# Patient Record
Sex: MALE | Race: WHITE
[De-identification: names, ages, dates, MRNs, and addresses within clinical notes are randomized per-mention and may not be internally consistent; named-entity substitution may affect disease eponyms.]

---

## 2019-10-04 ENCOUNTER — HOSPITAL ENCOUNTER (OUTPATIENT)
Dept: HOSPITAL 62 - OROUT | Age: 54
Discharge: HOME | End: 2019-10-04
Attending: ORTHOPAEDIC SURGERY
Payer: COMMERCIAL

## 2019-10-04 VITALS — DIASTOLIC BLOOD PRESSURE: 86 MMHG | SYSTOLIC BLOOD PRESSURE: 150 MMHG
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
## 2019-10-04 DIAGNOSIS — S52.532B: Primary | ICD-10-CM

## 2019-10-04 DIAGNOSIS — Z86.14: ICD-10-CM

## 2019-10-04 DIAGNOSIS — F17.210: ICD-10-CM

## 2019-10-04 DIAGNOSIS — S52.92XA: ICD-10-CM

## 2019-10-04 DIAGNOSIS — S52.92XK: ICD-10-CM

## 2019-10-04 LAB
ADD MANUAL DIFF: NO
ANION GAP SERPL CALC-SCNC: 9 MMOL/L (ref 5–19)
BASOPHILS # BLD AUTO: 0.1 10^3/UL (ref 0–0.2)
BASOPHILS NFR BLD AUTO: 0.7 % (ref 0–2)
BUN SERPL-MCNC: 16 MG/DL (ref 7–20)
CALCIUM: 9.3 MG/DL (ref 8.4–10.2)
CHLORIDE SERPL-SCNC: 107 MMOL/L (ref 98–107)
CO2 SERPL-SCNC: 24 MMOL/L (ref 22–30)
CRP SERPL-MCNC: < 5 MG/L (ref ?–10)
EOSINOPHIL # BLD AUTO: 0.1 10^3/UL (ref 0–0.6)
EOSINOPHIL NFR BLD AUTO: 0.7 % (ref 0–6)
ERYTHROCYTE [DISTWIDTH] IN BLOOD BY AUTOMATED COUNT: 13.2 % (ref 11.5–14)
ERYTHROCYTE [SEDIMENTATION RATE] IN BLOOD: 9 MM/HR (ref 0–20)
GLUCOSE SERPL-MCNC: 103 MG/DL (ref 75–110)
HCT VFR BLD CALC: 43.6 % (ref 37.9–51)
HGB BLD-MCNC: 15.4 G/DL (ref 13.5–17)
LYMPHOCYTES # BLD AUTO: 2.4 10^3/UL (ref 0.5–4.7)
LYMPHOCYTES NFR BLD AUTO: 23.1 % (ref 13–45)
MCH RBC QN AUTO: 31.8 PG (ref 27–33.4)
MCHC RBC AUTO-ENTMCNC: 35.3 G/DL (ref 32–36)
MCV RBC AUTO: 90 FL (ref 80–97)
MONOCYTES # BLD AUTO: 0.6 10^3/UL (ref 0.1–1.4)
MONOCYTES NFR BLD AUTO: 5.4 % (ref 3–13)
NEUTROPHILS # BLD AUTO: 7.4 10^3/UL (ref 1.7–8.2)
NEUTS SEG NFR BLD AUTO: 70.1 % (ref 42–78)
PLATELET # BLD: 201 10^3/UL (ref 150–450)
POTASSIUM SERPL-SCNC: 4 MMOL/L (ref 3.6–5)
RBC # BLD AUTO: 4.84 10^6/UL (ref 4.35–5.55)
TOTAL CELLS COUNTED % (AUTO): 100 %
WBC # BLD AUTO: 10.5 10^3/UL (ref 4–10.5)

## 2019-10-04 PROCEDURE — 87070 CULTURE OTHR SPECIMN AEROBIC: CPT

## 2019-10-04 PROCEDURE — 25405 REPAIR/GRAFT RADIUS OR ULNA: CPT

## 2019-10-04 PROCEDURE — 87075 CULTR BACTERIA EXCEPT BLOOD: CPT

## 2019-10-04 PROCEDURE — C1769 GUIDE WIRE: HCPCS

## 2019-10-04 PROCEDURE — 85025 COMPLETE CBC W/AUTO DIFF WBC: CPT

## 2019-10-04 PROCEDURE — C1713 ANCHOR/SCREW BN/BN,TIS/BN: HCPCS

## 2019-10-04 PROCEDURE — 36415 COLL VENOUS BLD VENIPUNCTURE: CPT

## 2019-10-04 PROCEDURE — 71045 X-RAY EXAM CHEST 1 VIEW: CPT

## 2019-10-04 PROCEDURE — 20680 REMOVAL OF IMPLANT DEEP: CPT

## 2019-10-04 PROCEDURE — 93010 ELECTROCARDIOGRAM REPORT: CPT

## 2019-10-04 PROCEDURE — 73100 X-RAY EXAM OF WRIST: CPT

## 2019-10-04 PROCEDURE — 87205 SMEAR GRAM STAIN: CPT

## 2019-10-04 PROCEDURE — 80048 BASIC METABOLIC PNL TOTAL CA: CPT

## 2019-10-04 PROCEDURE — 86140 C-REACTIVE PROTEIN: CPT

## 2019-10-04 PROCEDURE — 85652 RBC SED RATE AUTOMATED: CPT

## 2019-10-04 PROCEDURE — 01830 ANES ARTHR/NDSC WRST/HND NOS: CPT

## 2019-10-04 PROCEDURE — 93005 ELECTROCARDIOGRAM TRACING: CPT

## 2019-10-04 PROCEDURE — C1898 LEAD, PMKR, OTHER THAN TRANS: HCPCS

## 2019-10-04 PROCEDURE — 64721 CARPAL TUNNEL SURGERY: CPT

## 2019-10-04 NOTE — DISCHARGE SUMMARY
Discharge Summary (SDC)





- Discharge


Final Diagnosis: 





Left distal radius nonunion


Date of Surgery: 10/04/19


Discharge Date: 10/04/19


Condition: Good


Treatment or Instructions: 








Schedule Follow Up w/ Dr. Rikki Kirkland @ [facility] to be seen 

in 10-14 days or as scheduled


[place]: [phone] 


[place]: [phone]


[place]: [phone] 





Ice and elevate





Keep splint clean/dry/intact, do not remove.





If your fingers become numb please unwrap the Ace wrap but leave the splint in 

place, if the sensation does not return within 30 minutes please return to the 

emergency department.





May begin finger range of motion attempting to make full fist.





Please be aware that the Percocet/Norco does contain Tylenol.





Stool softener of choice when on pain medication.








ORAL NARCOTIC MEDICATION:


     You have been given a prescription for pain control.  This medication is a 

narcotic.  It's best taken with food, as nausea can result if taken on an empty 

stomach.


     Don't operate machinery or drive within six hours of taking this 

medication.  Do not combine this medicine with alcohol, or with any medication 

which can cause sedation (such as cold tablets or sleeping pills) unless you get

permission from the physician.


     Narcotics tend to cause constipation.  If possible, drink plenty of fluids 

and eat a diet high in fiber and fruits.





     Please be aware that prescription narcotics also have the potential for 

abuse.  People become addicted to these medications because of the general sense

of wellbeing that they induce.  This feeling along with a significant reduction 

in tension, anxiety, and aggression provides a stimulating seductive quality to 

these drugs.  Once your pain is under control, we encourage you to discard your 

unused narcotics.





Prescriptions: 


Oxycodone HCl/Acetaminophen [Percocet  Mg Tablet] 1 each PO Q6 PRN #25 

tablet


 PRN Reason: 


Discharge Diet: As Tolerated


Respiratory Treatments at Home: Deep Breathing/Coughing, Incentive Spirometer


Discharge Activity: No Lifting Over 10 Pounds, No Lifting/Push/Pulling


Report the Following to Your Physician Immediately: Fever over 101 Degrees, 

Unusual Bleeding, Redness, Swelling, Warmth

## 2019-10-04 NOTE — RADIOLOGY REPORT (SQ)
EXAM DESCRIPTION:  CHEST SINGLE VIEW



COMPLETED DATE/TIME:  10/4/2019 10:17 am



REASON FOR STUDY:  preop



COMPARISON:  None.



EXAM PARAMETERS:  NUMBER OF VIEWS: One view.

TECHNIQUE: Single frontal radiographic view of the chest acquired.

RADIATION DOSE: NA

LIMITATIONS: None.



FINDINGS:  LUNGS AND PLEURA: No opacities, masses or pneumothorax. No pleural effusion.

MEDIASTINUM AND HILAR STRUCTURES: No masses.  Contour normal.

HEART AND VASCULAR STRUCTURES: Heart normal in size.  Normal vasculature.

BONES: No acute findings.

HARDWARE: None in the chest.

OTHER: No other significant finding.



IMPRESSION:  NO ACUTE RADIOGRAPHIC FINDING IN THE CHEST.



TECHNICAL DOCUMENTATION:  JOB ID:  [id number]

 2011 [employer]- All Rights Reserved



Reading location - IP/workstation name: [email or web address]

## 2019-10-04 NOTE — OPERATIVE REPORT
Operative Report


DATE OF SURGERY: 10/04/19


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


POSTOPERATIVE DIAGNOSIS: Same


OPERATION: 1. Removal of hardware left distal radius.  2. Takedown nonunion with

harvesting of olecranon autograft with revision open reduction to fixation 

extra-articular distal radius fracture.  3.  Median neurolysis


SURGEON: ROJELIO DIGGS


ANESTHESIA: GA


COMPLICATIONS: 





None


ESTIMATED BLOOD LOSS: 30 cc


PROCEDURE: 





Indication for above procedure:





54-year-old male who sustained a severe left wrist injury resulting in an 

apparent open fracture.  Patient underwent open reduction to fixation in 

[place] and originally he had been doing well upon follow-up with me he 

was placed in a brace which she was not to remove only for hygiene purposes.  

With subsequent follow-ups radiographs demonstrate increased evidence of 

malunion and lack of fracture healing.  Given the amount of angulation and 

hardware failure decision was made to proceed with operative intervention risks 

and benefits were explained patient verbalized understanding consented for 

surgical procedure.  Preoperative inflammatory markers were negative.





Procedure In Detail:





Patient was seen and evaluated in the preoperative holding area.  The LEFT upper

extremity was initialized and marked.  Patient received 2g of Ancef IV for 

bacterial prophylaxis.  Patient was taken back to the operative room where 

transferred to the operative table and placed under general anesthesia.  Once 

they were adequately anesthetized a nonsterile tourniquet was placed on the 

upper extremity.  A surgical team debriefing was performed ensuring all 

instrumentation was available, the surgical procedure was discussed with 

possible concerns reviewed.  The upper extremity was prepped with chlorhexidine 

and alcohol and draped in a sterile fashion.   A timeout was done identifying 

correct patient, procedure and extremity everyone in attendance agree with this 

and verbalized no concerns. The extremity was exsanguinated the tourniquet was 

inflated to 250 mmHg.





Previous skin incision was utilized.  Blunt dissection was performed.  There was

significant scarring of the median nerve the adjacent flexor tendons and 

circumferential scar tissue extending from the wrist flexion crease to 

proximally.  Median neuro lysis was performed the palmar cutaneous branch of the

median nerve was identified as well the nerve was retracted and protected 

throughout the case.  Flexor carpi radialis was identified and retracted in a 

ulnar direction.  FPL was mobilized and retracted exposing the underlying 

fracture there was no evidence of pronator quadratus.  No evidence of purulent 

material was appreciated.  Bone was sent for culture and sensitivity however no 

intraoperative findings consistent with infection.





The [place] distal radius plate had broken adequate point all screws were 

removed.  Screw holes were debrided with a curette the fracture site was 

visualized and debrided to normal appearing cancellus bone.  Debridement was 

performed with a rondure and curette.  Curette was placed down the shaft of the 

proximal radius.  Attention then turned to harvesting bone graft.





Longitudinal skin incision was made over the olecranon C-arm fluoroscopy was 

obtained confirming appropriate placement.  Bone graft harvester was then 

inserted and cancellus bone graft harvested and placed on the back table.  The 

previous cortical shell was saved the area was copiously irrigated with normal 

saline and impacted with 5 cc of V toss synthetic bone graft.  Fascia and 

periosteum was closed with interrupted 3-0 Vicryl suture.  Any peripheral 

bleeding was controlled with bipolar cautery.  Skin was closed with interrupted 

3-0 nylon suture.  Attention then turned to fixation of the fracture.





Under direct visualization the fracture was reduced and held with two 0.054 K 

wires.  Once adequate alignment was appreciated the Acumed distal radius plate 

was secured along the distal segment and held with K wires.  C-arm fluoroscopy 

was obtained confirming appropriate placement.  Large reduction tenaculum 

brought the plate securing down to the volar cortex.  Plate was initially 

fixated with bicortical screw to avoid any residual with off of the plate.   

This screw was later switched out for a variable angle screw.  Remaining screws 

were drilled to but not through the far cortex and appropriate size locking 

screw placed.





The plate was then secured proximally with a fracture clamp.  C-arm fluoroscopy 

was obtained confirming restoration of radial inclination and volar tilt on 

lateral view unfortunately continued to be positive ulnar variance with extra 

attempts there is no residual lengthening available.  A lamina  was 

placed within the fracture site to maintain alignment.  Proximal aspect the 

plate was secured with bicortical fixation and 3 holes.  3 additional bicortical

locking screws were then placed.  





Wound was copiously irrigated with normal saline.  Tourniquet was deflated.  Any

peripheral bleeding was controlled with bipolar cautery until the wound was dry.

 There is good bleeding from the nonunion site.  Wound was irrigated one last 

time.  The nonunion site was impacted with cancellus bone from the olecranon 

autograft and 2.5 cc of the V toss synthetic bone graft.  Unfortunately was no 

remaining fascia of the pronator quadratus to allow for closure.  Subcutaneous 

tissues were closed with interrupted 4-0 Monocryl suture.  Skin was closed with 

alternating horizontal mattress and simple 3-0 nylon sutures.





Final C-arm fluoroscopy was obtained confirming restoration of radial 

inclination and volar tilt with positive ulnar variance.  Wound was dressed 

Xeroform 4 x 4's and patient was placed in a sugar tong splint.





Sponge counts, instrument counts, needle counts were correct.  Patient was then 

awoken from anesthesia.  Transferred from the operating room table to the 

operating room stretcher.  There was no intraoperative complications patient 

tolerated procedure well stable to PACU.





Postoperative plan:





Patient will follow-up in the office in 2 weeks we will obtain radiographs.  

Will be placed in a long-arm cast for 4 weeks postoperatively and begin bone 

stimulator.  We will continue strict immobilization until fracture healing is 

noted.

## 2019-10-04 NOTE — RADIOLOGY REPORT (SQ)
EXAM DESCRIPTION:  NO CHG FLUORO; WRIST LEFT 2 VIEWS



COMPLETED DATE/TIME:  10/4/2019 3:26 pm



REASON FOR STUDY:  HARDWARE REMOVAL / ORIF LEFT WRIST ASST WITH FLUORO IN OR; ORIF LEFT WRIST IN OR S
52.532B  COLLES' FRACTURE OF LEFT RADIUS, INIT FOR OPN FX TY S52.501A  UNSP FRACTURE OF THE LOWER END
 OF RIGHT RADIUS, INI



COMPARISON:  None.



FLUOROSCOPY TIME:  55 seconds

5 Images saved to PACS



TECHNIQUE:  Intra-operative images acquired during surgical procedure to evaluate progress.

NUMBER OF IMAGES: 5



LIMITATIONS:  None.



FINDINGS:  Limited fluoroscopic images obtained demonstrate evidence of distal radial plate and screw
 fixation.  Please see operative report for detailed description.



IMPRESSION:  IMAGE(S) OBTAINED DURING PROCEDURE.



COMMENT:  Quality :  Final reports for procedures using fluoroscopy that document radiation exp
osure indices, or exposure time and number of fluorographic images (if radiation exposure indices are
 not available)

Please consult full operative report of the attending physician for description of the procedure.



TECHNICAL DOCUMENTATION:  JOB ID:  [id number]

 2011 [employer]- All Rights Reserved



Reading location - IP/workstation name: [email or web address]

## 2019-10-04 NOTE — RADIOLOGY REPORT (SQ)
EXAM DESCRIPTION:  NO CHG FLUORO; WRIST LEFT 2 VIEWS



COMPLETED DATE/TIME:  10/4/2019 3:26 pm



REASON FOR STUDY:  HARDWARE REMOVAL / ORIF LEFT WRIST ASST WITH FLUORO IN OR; ORIF LEFT WRIST IN OR S
52.532B  COLLES' FRACTURE OF LEFT RADIUS, INIT FOR OPN FX TY S52.501A  UNSP FRACTURE OF THE LOWER END
 OF RIGHT RADIUS, INI



COMPARISON:  None.



FLUOROSCOPY TIME:  55 seconds

5 Images saved to PACS



TECHNIQUE:  Intra-operative images acquired during surgical procedure to evaluate progress.

NUMBER OF IMAGES: 5



LIMITATIONS:  None.



FINDINGS:  Limited fluoroscopic images obtained demonstrate evidence of distal radial plate and screw
 fixation.  Please see operative report for detailed description.



IMPRESSION:  IMAGE(S) OBTAINED DURING PROCEDURE.



COMMENT:  Quality :  Final reports for procedures using fluoroscopy that document radiation exp
osure indices, or exposure time and number of fluorographic images (if radiation exposure indices are
 not available)

Please consult full operative report of the attending physician for description of the procedure.



TECHNICAL DOCUMENTATION:  JOB ID:  [id number]

 2011 [employer]- All Rights Reserved



Reading location - IP/workstation name: [email or web address]

## 2019-10-04 NOTE — EKG REPORT
SEVERITY:- ABNORMAL ECG -

SINUS RHYTHM

INCOMPLETE RIGHT BUNDLE BRANCH BLOCK

:

Confirmed by: Ghazala Giang MD 04-Oct-2019 23:18:13

## 2020-02-28 LAB
ADD MANUAL DIFF: NO
ANION GAP SERPL CALC-SCNC: 12 MMOL/L (ref 5–19)
BASOPHILS # BLD AUTO: 0.1 10^3/UL (ref 0–0.2)
BASOPHILS NFR BLD AUTO: 0.9 % (ref 0–2)
BUN SERPL-MCNC: 13 MG/DL (ref 7–20)
CALCIUM: 9.3 MG/DL (ref 8.4–10.2)
CHLORIDE SERPL-SCNC: 104 MMOL/L (ref 98–107)
CO2 SERPL-SCNC: 26 MMOL/L (ref 22–30)
CRP SERPL-MCNC: 6.9 MG/L (ref ?–10)
EOSINOPHIL # BLD AUTO: 0.1 10^3/UL (ref 0–0.6)
EOSINOPHIL NFR BLD AUTO: 1.5 % (ref 0–6)
ERYTHROCYTE [DISTWIDTH] IN BLOOD BY AUTOMATED COUNT: 14.4 % (ref 11.5–14)
ERYTHROCYTE [SEDIMENTATION RATE] IN BLOOD: 9 MM/HR (ref 0–20)
GLUCOSE SERPL-MCNC: 89 MG/DL (ref 75–110)
HCT VFR BLD CALC: 45.9 % (ref 37.9–51)
HGB BLD-MCNC: 15.7 G/DL (ref 13.5–17)
LYMPHOCYTES # BLD AUTO: 2 10^3/UL (ref 0.5–4.7)
LYMPHOCYTES NFR BLD AUTO: 27.6 % (ref 13–45)
MCH RBC QN AUTO: 31.1 PG (ref 27–33.4)
MCHC RBC AUTO-ENTMCNC: 34.2 G/DL (ref 32–36)
MCV RBC AUTO: 91 FL (ref 80–97)
MONOCYTES # BLD AUTO: 0.6 10^3/UL (ref 0.1–1.4)
MONOCYTES NFR BLD AUTO: 8.6 % (ref 3–13)
NEUTROPHILS # BLD AUTO: 4.5 10^3/UL (ref 1.7–8.2)
NEUTS SEG NFR BLD AUTO: 61.4 % (ref 42–78)
PLATELET # BLD: 186 10^3/UL (ref 150–450)
POTASSIUM SERPL-SCNC: 4.7 MMOL/L (ref 3.6–5)
RBC # BLD AUTO: 5.04 10^6/UL (ref 4.35–5.55)
TOTAL CELLS COUNTED % (AUTO): 100 %
WBC # BLD AUTO: 7.4 10^3/UL (ref 4–10.5)

## 2020-02-28 NOTE — EKG REPORT
SEVERITY:- OTHERWISE NORMAL ECG -

SINUS RHYTHM

LEFT AXIS DEVIATION

:

Confirmed by: Ghazala Giang MD 28-Feb-2020 13:52:42

## 2020-02-28 NOTE — RADIOLOGY REPORT (SQ)
EXAM DESCRIPTION:  CHEST PA/LATERAL



COMPLETED DATE/TIME:  2/28/2020 10:31 am



REASON FOR STUDY:  PRE-OP



COMPARISON:  10/4/2019



EXAM PARAMETERS:  NUMBER OF VIEWS: two views

TECHNIQUE: Digital Frontal and Lateral radiographic views of the chest acquired.

RADIATION DOSE: NA

LIMITATIONS: none



FINDINGS:  LUNGS AND PLEURA: No opacities, masses or pneumothorax. No pleural effusion.

MEDIASTINUM AND HILAR STRUCTURES: No masses or contour abnormalities.

HEART AND VASCULAR STRUCTURES: Heart normal size.  No evidence for failure.

BONES: No acute findings.

HARDWARE: None in the chest.

OTHER: No other significant finding.



IMPRESSION:  NO SIGNIFICANT RADIOGRAPHIC FINDING IN THE CHEST.



TECHNICAL DOCUMENTATION:  JOB ID:  [id number]

 2011 [employer]- All Rights Reserved



Reading location - IP/workstation name: [email or web address]

## 2020-03-03 ENCOUNTER — HOSPITAL ENCOUNTER (OUTPATIENT)
Dept: HOSPITAL 62 - OROUT | Age: 55
Discharge: HOME | End: 2020-03-03
Attending: ORTHOPAEDIC SURGERY
Payer: COMMERCIAL

## 2020-03-03 VITALS — SYSTOLIC BLOOD PRESSURE: 129 MMHG | DIASTOLIC BLOOD PRESSURE: 86 MMHG

## 2020-03-03 DIAGNOSIS — S52.022D: ICD-10-CM
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
## 2020-03-03 DIAGNOSIS — L03.119: ICD-10-CM

## 2020-03-03 DIAGNOSIS — M62.81: ICD-10-CM

## 2020-03-03 DIAGNOSIS — F17.210: ICD-10-CM

## 2020-03-03 DIAGNOSIS — Z86.14: ICD-10-CM

## 2020-03-03 DIAGNOSIS — M25.522: ICD-10-CM

## 2020-03-03 DIAGNOSIS — S52.021K: Primary | ICD-10-CM

## 2020-03-03 DIAGNOSIS — Z87.891: ICD-10-CM

## 2020-03-03 PROCEDURE — 93010 ELECTROCARDIOGRAM REPORT: CPT

## 2020-03-03 PROCEDURE — 24685 OPTX ULNAR FX PROX END W/FIX: CPT

## 2020-03-03 PROCEDURE — 87075 CULTR BACTERIA EXCEPT BLOOD: CPT

## 2020-03-03 PROCEDURE — 86140 C-REACTIVE PROTEIN: CPT

## 2020-03-03 PROCEDURE — 71046 X-RAY EXAM CHEST 2 VIEWS: CPT

## 2020-03-03 PROCEDURE — 88305 TISSUE EXAM BY PATHOLOGIST: CPT

## 2020-03-03 PROCEDURE — 36415 COLL VENOUS BLD VENIPUNCTURE: CPT

## 2020-03-03 PROCEDURE — 73080 X-RAY EXAM OF ELBOW: CPT

## 2020-03-03 PROCEDURE — 80048 BASIC METABOLIC PNL TOTAL CA: CPT

## 2020-03-03 PROCEDURE — 85652 RBC SED RATE AUTOMATED: CPT

## 2020-03-03 PROCEDURE — 93005 ELECTROCARDIOGRAM TRACING: CPT

## 2020-03-03 PROCEDURE — 87205 SMEAR GRAM STAIN: CPT

## 2020-03-03 PROCEDURE — 87070 CULTURE OTHR SPECIMN AEROBIC: CPT

## 2020-03-03 PROCEDURE — 15130 DRM AGRFT T/A/L 1ST 100 SQCM: CPT

## 2020-03-03 PROCEDURE — 82306 VITAMIN D 25 HYDROXY: CPT

## 2020-03-03 PROCEDURE — 85025 COMPLETE CBC W/AUTO DIFF WBC: CPT

## 2020-03-03 NOTE — RADIOLOGY REPORT (SQ)
EXAM DESCRIPTION:  ELBOW LEFT OVER 2 VIEWS; NO CHG FLUORO



COMPLETED DATE/TIME:  3/3/2020 3:06 pm



REASON FOR STUDY:  ORIF LEFT ELBOW ASSISTED WITH FLUORO IN OR S52.022D  DISP FX OF OLECRAN PRO W/O IN
TARTIC EXTN L ULNA, 7T



COMPARISON:  None.



FLUOROSCOPY TIME:  1.0 minute.

4 images saved to PACS.



TECHNIQUE:  Intra-operative images acquired during surgical procedure to evaluate progress.

NUMBER OF IMAGES: 4 images.



LIMITATIONS:  None.



FINDINGS:  Images of the elbow acquired during the procedure.



IMPRESSION:  IMAGE(S) OBTAINED DURING PROCEDURE.



COMMENT:  Quality :  Final reports for procedures using fluoroscopy that document radiation exp
osure indices, or exposure time and number of fluorographic images (if radiation exposure indices are
 not available)

Please consult full operative report of the attending physician for description of the procedure.



TECHNICAL DOCUMENTATION:  JOB ID:  [id number]

 2011 [employer] All Rights Reserved



Reading location - IP/workstation name: [email or web address]

## 2020-03-03 NOTE — RADIOLOGY REPORT (SQ)
EXAM DESCRIPTION:  ELBOW LEFT OVER 2 VIEWS; NO CHG FLUORO



COMPLETED DATE/TIME:  3/3/2020 3:06 pm



REASON FOR STUDY:  ORIF LEFT ELBOW ASSISTED WITH FLUORO IN OR S52.022D  DISP FX OF OLECRAN PRO W/O IN
TARTIC EXTN L ULNA, 7T



COMPARISON:  None.



FLUOROSCOPY TIME:  1.0 minute.

4 images saved to PACS.



TECHNIQUE:  Intra-operative images acquired during surgical procedure to evaluate progress.

NUMBER OF IMAGES: 4 images.



LIMITATIONS:  None.



FINDINGS:  Images of the elbow acquired during the procedure.



IMPRESSION:  IMAGE(S) OBTAINED DURING PROCEDURE.



COMMENT:  Quality :  Final reports for procedures using fluoroscopy that document radiation exp
osure indices, or exposure time and number of fluorographic images (if radiation exposure indices are
 not available)

Please consult full operative report of the attending physician for description of the procedure.



TECHNICAL DOCUMENTATION:  JOB ID:  [id number]

 2011 [employer]- All Rights Reserved



Reading location - IP/workstation name: [email or web address]

## 2020-03-03 NOTE — OPERATIVE REPORT
Operative Report


DATE OF SURGERY: 03/03/20


PREOPERATIVE DIAGNOSIS: Left olecranon nonunion


POSTOPERATIVE DIAGNOSIS: Same


OPERATION: Takedown nonunion revision open reduction and screw fixation left 

olecranon with placement of proximal tibial autograft


SURGEON: ROJELIO DIGGS


ANESTHESIA: GA


COMPLICATIONS: 





None


ESTIMATED BLOOD LOSS: Minimal


PROCEDURE: 





Indication for above procedure:





54-year-old male who sustained open fracture of his left distal radius at 

outside facility.  Unfortunately patient developed nonunion with hardware 

failure.  Patient underwent successful revision open reduction internal fixation

of his left distal radius with olecranon autograft.  However patient sustained 

an injury resulting in fracture of his olecranon site and ultimately infection. 

Patient then required additional irrigation debridement and provisional 

fixation.  Patient had been doing well but continued to have evidence of 

persistent nonunion of the olecranon at that point decision was made to proceed 

with operative intervention.





Procedure In Detail:





Patient was seen and evaluated in the preoperative holding area.  The LEFT upper

extremity was initialized and marked.  Patient received 2g of Ancef IV and 

vancomycin for bacterial prophylaxis.  Patient was taken back to the operative 

room where transferred to the operative table and placed under general 

anesthesia.  Once they were adequately anesthetized a nonsterile tourniquet was 

placed on the upper extremity.  A surgical team debriefing was performed 

ensuring all instrumentation was available, the surgical procedure was discussed

with possible concerns reviewed.  The upper and lower extremity was prepped with

ChloraPrep and draped in a sterile fashion.   A timeout was done identifying 

correct patient, procedure and extremity everyone in attendance agree with this 

and verbalized no concerns. The extremity was exsanguinated the tourniquet was 

inflated to 250 mmHg.





Longitudinal skin incision was utilized.  Sharp dissection was performed around 

the deep hardware which was successfully removed with C arm guidance.  

Supraperiosteal dissection was performed proximal and distal to the nonunion 

site.  Once the nonunion site was approached cultures were obtained.  There is 

no evidence of purulence.  Exploration of the nonunion site demonstrated intact 

cortical hinged laterally.  Nonunion site was then repaired proximally and 

distally until normal-appearing cancellus bone was identified.  There was a 

cavitary defect along the olecranon.  Proximal and distal fragments per debrided

with a curette down the shaft.  Wound was copiously irrigated with normal 

saline.  Tourniquet was then deflated to confirm adequate debridement proximally

and distally.  Saline soaked sponge was placed over the wound well bone graft 

harvesting was performed.





Right lower extremity was exsanguinated and tourniquet was inflated.  Oblique 

skin incision was made over Marcela's tubercle.  3 sided periosteal block was 

performed and completed with an osteotome and hinged along the superior cortex. 

Cancellus bone was then harvested from the proximal tibial and placed on the 

back table.  Once adequate bone graft was obtained the wound was copiously 

irrigated with normal saline.  Any small peripheral veins were coagulated with 

bipolar cautery.  Defect was filled with cancellus bone chips.  Periosteal hinge

and cortex was replaced and secured with 0 Vicryl suture.  Subcutaneous tissues 

were closed with 4-0 Monocryl suture.  Skin was closed with running subcuticular

4-0 Monocryl reinforced with Dermabond and Steri-Strips.  Tourniquet was 

deflated.  Soft dressing applied.





Left upper extremity was then exsanguinated and tourniquet inflated to 250 mmHg.

 Wound was copiously irrigated with normal saline.  A mixture of DBX and 

proximal tibial autograft were impacted into the cavitary defect.  A [place] 

olecranon plate was then applied to the proximal fragment with a cortical screw 

and a locking screw.  Cortical screw was then exchanged for appropriate size 

locking screw.  With a large bone tenaculum the fracture was compressed.  

Bicortical screw was placed through the dynamic hole within the distal fragment.

 The large cortex screw was then inserted providing instrument fragmentary 

compression.  Distal fixation was completed with additional bicortical screw and

2 additional locking screws.  Fixation proximally was completed with additional 

locking screw.  The large "homerun" screw was switched for the appropriate size 

locking screw.  





C arm fluoroscopy was obtained demonstrating restoration of alignment and 

adequate compression.  No evidence of ulnohumeral or radiocapitellar 

malalignment.  Patient had full elbow range of motion passively without crepitus

upon range of motion.  Deep soft tissue closed with 0 Vicryl suture.  Wound was 

irrigated with normal saline.  Subcutaneous tissue closed with 4-0 Monocryl 

suture.  Skin was closed with horizontal mattress 3-0 nylon and staples.  30 cc 

of 0.5% Vivacaine without epinephrine was injected for postoperative pain 

control.





Patient was placed in a posterior elbow splint with the elbow at 60 degrees of 

flexion.  Sponge counts, instrument counts, needle counts were correct.  Patient

was then awoken from anesthesia.  Transferred from the operating room table to 

the operating room stretcher.  There was no intraoperative complications patient

tolerated procedure well stable to PACU.








Postoperative plan:





Patient follow-up the office in 2 weeks at which point we will transition to 

hinged elbow brace.  Patient will avoid weightbearing until radiographic union 

is completed.  We will obtain radiographs of the elbow at follow-up and patient 

will continue bone stimulator.

## 2020-03-03 NOTE — DISCHARGE SUMMARY
Discharge Summary (SDC)





- Discharge


Final Diagnosis: 





Left olecranon nonunion


Date of Surgery: 03/03/20


Discharge Date: 03/03/20


Condition: Good


Forms:  [facility] Anesthesia D/C Instruction, Discharge POC-Surgical Service


Treatment or Instructions: 








Schedule Follow Up w/ Dr. Rikki Diggs @ [facility] to be seen 

in 10-14 days or as scheduled


[place]: [phone] 


[place]: [phone]


[place]: [phone] 





Ice and elevate





Keep splint clean/dry/intact, do not remove.





If your fingers become numb please unwrap the Ace wrap but leave the splint in 

place, if the sensation does not return within 30 minutes please return to the 

emergency department.





May begin finger range of motion attempting to make full fist.





Please use ibuprofen (Motrin or Advil) 600-800 mg every 8 hours as needed for 

pain or fever DO NOT TAKE w/ TORADOL may use once TORADOL complete.  You may 

also use acetaminophen (Tylenol) 1000 mg every 4-6 hours as needed for pain or 

fever.  Please be aware that many medications contain acetaminophen, do not 

exceed a total of 1000 mg of acetaminophen every 6 hours.  





If ibuprofen and acetaminophen are not sufficient for your pain you may take the

Percocet/Norco.  Please be aware that the Percocet/Norco does contain Tylenol.





Stool softener of choice when on pain medication.





USE OF OVER-THE-COUNTER IBUPROFEN:


     Ibuprofen (Advil, Nuprin, Medipren, Motrin IB) is a medication for fever 

and pain control.  In addition, it has anti- inflammatory effects which may be 

beneficial, especially in the treatment of injuries.


     It's best to take ibuprofen with food.  Persons with ulcer disease or 

allergy to aspirin should notify their physician of this before taking 

ibuprofen.


     Ibuprofen can be given every four to six hours, for a total of four doses 

daily.


     Age              Pain or fever dose          Antiinflammatory dose


     6-8 yr              200 mg (1 tab)                200 mg (1 tab)


     9-11 yr             200 mg (1 tab)                200-400 mg (1-2 tab)


     11-14 yr            200-400 mg (1-2 tab)         400 mg (2 tab)


     15-adult            400 mg (2 tab)                600 mg (3 tab)








ORAL NARCOTIC MEDICATION:


     You have been given a prescription for pain control.  This medication is a 

narcotic.  It's best taken with food, as nausea can result if taken on an empty 

stomach.


     Don't operate machinery or drive within six hours of taking this 

medication.  Do not combine this medicine with alcohol, or with any medication 

which can cause sedation (such as cold tablets or sleeping pills) unless you get

permission from the physician.


     Narcotics tend to cause constipation.  If possible, drink plenty of fluids 

and eat a diet high in fiber and fruits.





     Please be aware that prescription narcotics also have the potential for 

abuse.  People become addicted to these medications because of the general sense

of wellbeing that they induce.  This feeling along with a significant reduction 

in tension, anxiety, and aggression provides a stimulating seductive quality to 

these drugs.  Once your pain is under control, we encourage you to discard your 

unused narcotics.





Prescriptions: 


Oxycodone HCl/Acetaminophen [Percocet  mg Tablet] 1 each PO Q6 PRN #25 

tablet


 PRN Reason: 


Referrals: 


RIKKI DIGGS DO [ACTIVE STAFF] - 


Discharge Diet: As Tolerated


Respiratory Treatments at Home: Deep Breathing/Coughing


Discharge Activity: No Lifting Over 10 Pounds, No Lifting/Push/Pulling


Report the Following to Your Physician Immediately: Fever over 101 Degrees, 

Unusual Bleeding, Redness, Swelling, Warmth, Increased Soreness